# Patient Record
Sex: MALE | Race: WHITE | NOT HISPANIC OR LATINO
[De-identification: names, ages, dates, MRNs, and addresses within clinical notes are randomized per-mention and may not be internally consistent; named-entity substitution may affect disease eponyms.]

---

## 2021-08-24 PROBLEM — Z00.00 ENCOUNTER FOR PREVENTIVE HEALTH EXAMINATION: Status: ACTIVE | Noted: 2021-08-24

## 2021-08-25 ENCOUNTER — APPOINTMENT (OUTPATIENT)
Dept: PEDIATRIC ORTHOPEDIC SURGERY | Facility: CLINIC | Age: 71
End: 2021-08-25
Payer: OTHER MISCELLANEOUS

## 2021-08-25 VITALS — WEIGHT: 160 LBS | BODY MASS INDEX: 25.71 KG/M2 | TEMPERATURE: 98 F | HEIGHT: 66 IN

## 2021-08-25 DIAGNOSIS — Z86.39 PERSONAL HISTORY OF OTHER ENDOCRINE, NUTRITIONAL AND METABOLIC DISEASE: ICD-10-CM

## 2021-08-25 PROCEDURE — 99212 OFFICE O/P EST SF 10 MIN: CPT

## 2021-08-25 PROCEDURE — 99072 ADDL SUPL MATRL&STAF TM PHE: CPT

## 2021-08-25 RX ORDER — SIMVASTATIN 20 MG/1
20 TABLET, FILM COATED ORAL
Refills: 0 | Status: ACTIVE | COMMUNITY

## 2021-08-25 RX ORDER — PREDNISONE 10 MG/1
10 TABLET ORAL
Refills: 0 | Status: ACTIVE | COMMUNITY

## 2021-08-25 RX ORDER — DIVALPROEX SODIUM 500 MG/1
500 TABLET, DELAYED RELEASE ORAL
Refills: 0 | Status: ACTIVE | COMMUNITY

## 2021-08-25 RX ORDER — IBUPROFEN 800 MG/1
800 TABLET, FILM COATED ORAL 3 TIMES DAILY
Qty: 90 | Refills: 2 | Status: ACTIVE | COMMUNITY
Start: 2021-08-25 | End: 1900-01-01

## 2021-08-25 RX ORDER — PANTOPRAZOLE SODIUM 40 MG/1
40 GRANULE, DELAYED RELEASE ORAL
Refills: 0 | Status: ACTIVE | COMMUNITY

## 2021-08-26 NOTE — ASSESSMENT
[FreeTextEntry1] : Left lumbar radiculitis\par \par This patient will be treated with Motrin 800 mg 3 times a day.  He will return in 3 weeks for reevaluation.  If he is not improving his spine will be injected with Depo-Medrol and lidocaine.

## 2021-08-26 NOTE — PHYSICAL EXAM
[de-identified] : On physical examination there is bilateral lumbar muscle spasm worse on the left than the right.  The patient has only 30 degrees of flexion of the lumbar spine and left lateral bending increases his left leg pain.  Straight leg raising is positive on the left at 50 degrees.  Motor sensory and deep tendon reflex examination of both lower extremities is within normal limits.

## 2021-08-26 NOTE — HISTORY OF PRESENT ILLNESS
[de-identified] : This patient returns for reevaluation of lumbar radiculitis secondary to a work-related injury.  The patient has had an acute exacerbation of the pain at this time.

## 2021-09-22 ENCOUNTER — APPOINTMENT (OUTPATIENT)
Dept: PEDIATRIC ORTHOPEDIC SURGERY | Facility: CLINIC | Age: 71
End: 2021-09-22
Payer: OTHER MISCELLANEOUS

## 2021-09-22 VITALS — BODY MASS INDEX: 25.71 KG/M2 | HEIGHT: 66 IN | WEIGHT: 160 LBS

## 2021-09-22 PROCEDURE — 99212 OFFICE O/P EST SF 10 MIN: CPT

## 2021-09-22 PROCEDURE — 99072 ADDL SUPL MATRL&STAF TM PHE: CPT

## 2021-09-22 NOTE — PHYSICAL EXAM
[de-identified] : On physical examination there is left lumbar muscle spasm.  Left lateral bending and rotation increases this patient's pain.  There is positive straight leg raising on the left at 60 degrees.  Motor sensory and deep tendon reflex examination of both lower extremities is within normal limits.

## 2021-09-22 NOTE — HISTORY OF PRESENT ILLNESS
[de-identified] : This 71-year-old male returns for reevaluation of a left lumbar radiculitis secondary to a work-related injury.  This is been a chronic problem for this patient.  He has been treated symptomatically and he has settled down at this time.

## 2021-09-22 NOTE — ASSESSMENT
[FreeTextEntry1] : Left lumbar radiculitis\par \par Patient will continue symptomatic treatment and he will return on a as needed basis.\par \par Encounter time: 17 minutes

## 2021-11-23 ENCOUNTER — APPOINTMENT (OUTPATIENT)
Dept: PEDIATRIC ORTHOPEDIC SURGERY | Facility: CLINIC | Age: 71
End: 2021-11-23
Payer: OTHER MISCELLANEOUS

## 2021-11-23 VITALS — WEIGHT: 160 LBS | BODY MASS INDEX: 25.71 KG/M2 | HEIGHT: 66 IN

## 2021-11-23 DIAGNOSIS — L02.31 CUTANEOUS ABSCESS OF BUTTOCK: ICD-10-CM

## 2021-11-23 PROCEDURE — 20552 NJX 1/MLT TRIGGER POINT 1/2: CPT

## 2021-11-23 PROCEDURE — 99213 OFFICE O/P EST LOW 20 MIN: CPT | Mod: 25

## 2021-11-23 PROCEDURE — 99072 ADDL SUPL MATRL&STAF TM PHE: CPT

## 2021-11-23 RX ADMIN — TRIAMCINOLONE ACETONIDE 0 MG/ML: 40 INJECTION, SUSPENSION INTRA-ARTICULAR; INTRAMUSCULAR at 00:00

## 2021-11-23 RX ADMIN — Medication 0 %: at 00:00

## 2021-11-23 NOTE — HISTORY OF PRESENT ILLNESS
[de-identified] : This 71-year-old male with chronic lumbar radiculitis returns for marked increase in bilateral lumbar spine pain.  He has mostly left leg pain but occasionally right leg pain.  There has been no new injury.

## 2021-11-23 NOTE — ASSESSMENT
[FreeTextEntry1] : Chronic lumbar radiculitis\par Myofascial pain syndrome lumbar spine\par \par The patient had trigger point injections into the right and left lumbar musculature.  He will return on a as needed basis.\par \par Encounter time: 20 minutes

## 2021-11-23 NOTE — PHYSICAL EXAM
[de-identified] : On physical examination the patient has bilateral lumbar muscle spasm worse on the left than the right.  Range of motion is significantly decreased with left and right lateral bending increases his back pain straight leg raising is positive bilaterally.  Motor or sensory and deep tendon reflex examination of both lower extremities is within normal limits.

## 2021-11-24 ENCOUNTER — MED ADMIN CHARGE (OUTPATIENT)
Age: 71
End: 2021-11-24

## 2021-11-24 RX ORDER — LIDOCAINE HYDROCHLORIDE 10 MG/ML
1 INJECTION, SOLUTION INFILTRATION; PERINEURAL
Qty: 0 | Refills: 0 | Status: COMPLETED | OUTPATIENT
Start: 2021-11-23

## 2021-11-24 RX ORDER — TRIAMCINOLONE ACETONIDE 40 MG/ML
40 SUSPENSION INTRA-ARTERIAL; INTRAMUSCULAR
Qty: 1 | Refills: 0 | Status: COMPLETED | OUTPATIENT
Start: 2021-11-23

## 2022-09-15 ENCOUNTER — APPOINTMENT (OUTPATIENT)
Dept: PEDIATRIC ORTHOPEDIC SURGERY | Facility: CLINIC | Age: 72
End: 2022-09-15

## 2022-09-15 VITALS — WEIGHT: 160 LBS | HEIGHT: 66 IN | BODY MASS INDEX: 25.71 KG/M2

## 2022-09-15 PROCEDURE — 99212 OFFICE O/P EST SF 10 MIN: CPT | Mod: 25

## 2022-09-15 PROCEDURE — 20552 NJX 1/MLT TRIGGER POINT 1/2: CPT

## 2022-09-15 PROCEDURE — 99072 ADDL SUPL MATRL&STAF TM PHE: CPT

## 2022-09-15 RX ORDER — MELOXICAM 15 MG/1
15 TABLET ORAL DAILY
Qty: 30 | Refills: 5 | Status: ACTIVE | COMMUNITY
Start: 2022-09-15 | End: 1900-01-01

## 2022-09-15 NOTE — PHYSICAL EXAM
[de-identified] : On physical examination there is left lumbar muscle spasm and tenderness.  Left lateral bending and rotation increases his back and leg pain.  There is positive straight leg raising on the left at 50 degrees.  Motor sensory and deep tendon reflex examination of both lower extremities is within normal limits.

## 2022-09-15 NOTE — PROCEDURE
[de-identified] : 2 mils of 1% plain lidocaine and 1 mL of 40 mg of Depo-Medrol have been injected into the left lumbar musculature (trigger point injection).

## 2022-09-15 NOTE — HISTORY OF PRESENT ILLNESS
[de-identified] : This 72-year-old male returns with increasing left-sided low back pain with radiation into the left leg.  Patient is having difficulty with bending and sleeping.

## 2022-09-15 NOTE — ASSESSMENT
[FreeTextEntry1] : Left lumbar radiculitis secondary to work-related injury\par \par Patient has also been given a prescription for meloxicam.  He will return on a as needed basis.

## 2022-09-26 ENCOUNTER — APPOINTMENT (OUTPATIENT)
Dept: PEDIATRIC ORTHOPEDIC SURGERY | Facility: CLINIC | Age: 72
End: 2022-09-26

## 2022-09-26 VITALS — BODY MASS INDEX: 25.71 KG/M2 | HEIGHT: 66 IN | WEIGHT: 160 LBS

## 2022-09-26 PROCEDURE — 99072 ADDL SUPL MATRL&STAF TM PHE: CPT

## 2022-09-26 PROCEDURE — 99212 OFFICE O/P EST SF 10 MIN: CPT | Mod: 25

## 2022-09-26 PROCEDURE — 20552 NJX 1/MLT TRIGGER POINT 1/2: CPT

## 2022-09-26 RX ORDER — SULINDAC 200 MG/1
200 TABLET ORAL TWICE DAILY
Qty: 60 | Refills: 1 | Status: ACTIVE | COMMUNITY
Start: 2022-09-26 | End: 1900-01-01

## 2022-09-26 NOTE — ASSESSMENT
[FreeTextEntry1] : Pression: Myalgias lumbar spine.\par \par I have injected the trigger point he has been placed on Clinoril and will return as necessary

## 2022-09-26 NOTE — HISTORY OF PRESENT ILLNESS
[de-identified] : This 72-year-old returns with persistent pain in the left side of the lumbar region where he was previously injected.  He did not get any benefit at all from the injection or the Mobic which had been prescribed.  He continues to have significant pain that radiates down his left leg

## 2022-09-26 NOTE — PROCEDURE
[FreeTextEntry1] : Under sterile technique with an alcohol prep to trigger point in the left-sided lumbar musculature was injected with 40 mg of methylprednisolone and 4 cc of 1% lidocaine with a Band-Aid dressing applied at the conclusion this was tolerated without incident

## 2022-09-26 NOTE — PHYSICAL EXAM
[de-identified] : His exam reveals normal gait he has restricted motion to the lumbar spine in flexion extension mainly pain and spasm on the left side with a trigger point over the lumbar musculature.  He is neurologically stable

## 2022-10-03 ENCOUNTER — APPOINTMENT (OUTPATIENT)
Dept: PEDIATRIC ORTHOPEDIC SURGERY | Facility: CLINIC | Age: 72
End: 2022-10-03

## 2022-10-03 VITALS — TEMPERATURE: 98.2 F | BODY MASS INDEX: 25.71 KG/M2 | HEIGHT: 66 IN | WEIGHT: 160 LBS

## 2022-10-03 PROCEDURE — 20552 NJX 1/MLT TRIGGER POINT 1/2: CPT

## 2022-10-03 PROCEDURE — 99072 ADDL SUPL MATRL&STAF TM PHE: CPT

## 2022-10-03 PROCEDURE — 99212 OFFICE O/P EST SF 10 MIN: CPT | Mod: 25

## 2022-10-03 RX ORDER — METHYLPREDNISOLONE 4 MG/1
4 TABLET ORAL
Qty: 1 | Refills: 0 | Status: ACTIVE | COMMUNITY
Start: 2022-10-03 | End: 1900-01-01

## 2022-10-03 NOTE — ASSESSMENT
[FreeTextEntry1] : Chronic left lumbar radiculitis\par \par The patient has been placed on a Medrol Dosepak.  He will return in 2 weeks for reevaluation.  If he has not improved he will be indicated for an MRI of the lumbar spine.  Plain film of the lumbar spine will be taken at that time as well.

## 2022-10-03 NOTE — HISTORY OF PRESENT ILLNESS
[de-identified] : This 72-year-old male returns for reevaluation of chronic left lumbar radiculitis secondary to a work-related injury.  The patient has had marked increase in the left leg pain making it difficult for him to perform his usual activities of daily living.

## 2022-10-03 NOTE — PHYSICAL EXAM
[de-identified] : On physical examination patient has marked left lumbar muscle spasm and tenderness.  There is positive straight leg raising on the left at 40 degrees.  Motor sensory and deep tendon reflex examination of both lower extremities is within normal limits.

## 2022-10-03 NOTE — PROCEDURE
[de-identified] : 2 mL of 1% lidocaine and 1 mL of 40 mg of Depo-Medrol have been injected into the left lumbar musculature (trigger point injection)

## 2022-10-17 ENCOUNTER — APPOINTMENT (OUTPATIENT)
Dept: PEDIATRIC ORTHOPEDIC SURGERY | Facility: CLINIC | Age: 72
End: 2022-10-17

## 2022-10-17 VITALS — WEIGHT: 160 LBS | HEIGHT: 66 IN | BODY MASS INDEX: 25.71 KG/M2

## 2022-10-17 DIAGNOSIS — M79.18 MYALGIA, OTHER SITE: ICD-10-CM

## 2022-10-17 DIAGNOSIS — M54.16 RADICULOPATHY, LUMBAR REGION: ICD-10-CM

## 2022-10-17 PROCEDURE — 99213 OFFICE O/P EST LOW 20 MIN: CPT

## 2022-10-17 PROCEDURE — 99072 ADDL SUPL MATRL&STAF TM PHE: CPT

## 2022-10-17 NOTE — PHYSICAL EXAM
[de-identified] : On physical examination the patient has bilateral lumbar muscle spasm worse on the left than the right.  left lateral bending and rotation increases this patient's back and leg pain.  Straight leg raising is positive on the left at 50 degrees.  Motor sensory and deep tendon reflex examination of both lower extremities is within normal limits.

## 2022-10-17 NOTE — ASSESSMENT
[FreeTextEntry1] : Chronic left lumbar radiculitis secondary to work-related injury\par \par This patient will continue symptomatic treatment.  He will return in 1 month for reevaluation.

## 2022-11-14 ENCOUNTER — APPOINTMENT (OUTPATIENT)
Dept: PEDIATRIC ORTHOPEDIC SURGERY | Facility: CLINIC | Age: 72
End: 2022-11-14

## 2022-11-14 VITALS — HEIGHT: 66 IN | BODY MASS INDEX: 25.71 KG/M2 | TEMPERATURE: 98.3 F | WEIGHT: 160 LBS

## 2022-11-14 PROCEDURE — 20610 DRAIN/INJ JOINT/BURSA W/O US: CPT | Mod: LT

## 2022-11-14 PROCEDURE — 73030 X-RAY EXAM OF SHOULDER: CPT

## 2022-11-14 PROCEDURE — 99072 ADDL SUPL MATRL&STAF TM PHE: CPT

## 2022-11-14 PROCEDURE — 99212 OFFICE O/P EST SF 10 MIN: CPT | Mod: 25

## 2022-11-14 PROCEDURE — 73060 X-RAY EXAM OF HUMERUS: CPT

## 2022-11-15 NOTE — HISTORY OF PRESENT ILLNESS
[FreeTextEntry1] : This 72-year-old male is here for evaluation of a 2-week history of left shoulder and upper arm pain without history of injury.  Patient is having difficulty moving his arm above his head.  He is also complaining of significant pain in left arm (anterior aspect).

## 2022-11-15 NOTE — DATA REVIEWED
[de-identified] : X-ray evaluation of the left shoulder on 11/14/2022 (AP and lateral views) reveals calcific tendinitis of the shoulder.  There is also degenerative changes noted in the acromioclavicular joint and to a lesser degree the glenohumeral joint\par Indication for shoulder x-ray: To determine presence of arthritis or bone lesion.\par \par X-ray evaluation of the left humerus on 11/14/2022 (AP and lateral views) reveals no obvious abnormalities.\par Indication for humeral x-ray: To determine presence of bone lesion

## 2022-11-15 NOTE — ASSESSMENT
[FreeTextEntry1] : Calcific tendinitis left shoulder\par Biceps tendinitis\par \par This patient will return if symptoms persist past 2 weeks.

## 2022-11-15 NOTE — PHYSICAL EXAM
[FreeTextEntry1] : Patient has limited range of motion of the left shoulder.  He can actively abduct to 80 degrees and forward flex to 90 degrees.  Attempts at external rotation with the shoulder held in 90 degrees of abduction causes significant pain.  The patient has tenderness along the course of the humerus.  Attempts at flexion of the elbow against resistance increases his pain.  The biceps tendon proximally is intact.

## 2022-11-15 NOTE — PROCEDURE
[de-identified] : 1 mL of 40 mg of Depo-Medrol and 2 mL of 1% plain lidocaine have been injected into the left shoulder.

## 2022-11-28 ENCOUNTER — APPOINTMENT (OUTPATIENT)
Dept: PEDIATRIC ORTHOPEDIC SURGERY | Facility: CLINIC | Age: 72
End: 2022-11-28

## 2022-11-28 VITALS — TEMPERATURE: 97.2 F | BODY MASS INDEX: 25.71 KG/M2 | WEIGHT: 160 LBS | HEIGHT: 66 IN

## 2022-11-28 DIAGNOSIS — M75.32 CALCIFIC TENDINITIS OF LEFT SHOULDER: ICD-10-CM

## 2022-11-28 DIAGNOSIS — M75.22 BICIPITAL TENDINITIS, LEFT SHOULDER: ICD-10-CM

## 2022-11-28 PROCEDURE — 20551 NJX 1 TENDON ORIGIN/INSJ: CPT | Mod: LT

## 2022-11-28 PROCEDURE — 99212 OFFICE O/P EST SF 10 MIN: CPT | Mod: 25

## 2022-11-30 PROBLEM — M75.22 BICEPS TENDINITIS OF LEFT SHOULDER: Status: ACTIVE | Noted: 2022-11-15

## 2022-11-30 NOTE — PHYSICAL EXAM
[FreeTextEntry1] : On physical examination there is no tenderness where the humerus was previously injected but there is tenderness at the insertion of the deltoid on the lateral aspect of the humerus.  Attempts at abduction of the shoulder against resistance increases his pain.

## 2022-11-30 NOTE — HISTORY OF PRESENT ILLNESS
[FreeTextEntry1] : This patient returns for reevaluation of calcific tendinitis of the left shoulder.  Patient continues to have pain along the lateral aspect of the shoulder at the insertion of the deltoid on the humerus.  This is quite tender.

## 2022-11-30 NOTE — PROCEDURE
[de-identified] : 1 mL of 40 mg of Depo-Medrol and 2 mL of 1% plain lidocaine was injected into the left shoulder.

## 2022-11-30 NOTE — ASSESSMENT
[FreeTextEntry1] : Calcific tendinitis left shoulder\par \par Because this patient has gastric reflux I have not placed him on an anti-inflammatory medication.  He may return for repeat injection.  He may have to have an MRI.

## 2024-01-26 ENCOUNTER — LABORATORY RESULT (OUTPATIENT)
Age: 74
End: 2024-01-26

## 2024-01-26 ENCOUNTER — APPOINTMENT (OUTPATIENT)
Dept: PEDIATRIC ORTHOPEDIC SURGERY | Facility: CLINIC | Age: 74
End: 2024-01-26
Payer: MEDICARE

## 2024-01-26 VITALS
WEIGHT: 175 LBS | TEMPERATURE: 96.8 F | BODY MASS INDEX: 26.52 KG/M2 | HEIGHT: 68 IN | DIASTOLIC BLOOD PRESSURE: 81 MMHG | SYSTOLIC BLOOD PRESSURE: 150 MMHG

## 2024-01-26 DIAGNOSIS — M23.8X2 OTHER INTERNAL DERANGEMENTS OF LEFT KNEE: ICD-10-CM

## 2024-01-26 PROCEDURE — 20610 DRAIN/INJ JOINT/BURSA W/O US: CPT | Mod: LT

## 2024-01-26 PROCEDURE — 73562 X-RAY EXAM OF KNEE 3: CPT | Mod: LT

## 2024-01-26 PROCEDURE — 99213 OFFICE O/P EST LOW 20 MIN: CPT | Mod: 25

## 2024-01-26 RX ORDER — METHYLPREDNISOLONE 4 MG/1
4 TABLET ORAL
Qty: 1 | Refills: 1 | Status: ACTIVE | COMMUNITY
Start: 2024-01-26 | End: 1900-01-01

## 2024-01-26 NOTE — HISTORY OF PRESENT ILLNESS
[de-identified] : This 73-year-old is seen with a 1 week history of acute onset of pain and swelling to the left knee with stiffness and limp.  He does not have an obvious traumatic or precipitating event no locking popping or sensation of instability.  No recent illness fever or rash no other joint complaints.  Prior to this she had been doing well

## 2024-01-26 NOTE — PROCEDURE
[FreeTextEntry1] : Under sterile technique with a Betadine prep left knee was aspirated from the suprapatellar medial approach with an 18-gauge needle of approximately 20 cc of xanthochromic fluid following which the knee was injected with 40 mg of methylprednisolone and 2 cc of 1% lidocaine with a Band-Aid dressing applied at the conclusion.  This was tolerated without incident

## 2024-01-26 NOTE — ASSESSMENT
[FreeTextEntry1] : Impression: Internal derangement left knee with mild degenerative change rule out Lyme arthritis.  The knee was aspirated and injected with 40 mg of methylprednisolone and 2 cc of 1% lidocaine and the fluid was sent for a Lyme PCR.  He has been placed on a Medrol Dosepak will return if he is not progressing

## 2024-01-26 NOTE — PHYSICAL EXAM
[de-identified] : Examination today reveals an antalgic gait on the left side he has good motion to the left hip the left knee has full extension flexes only approximately 100 degrees.  He has a large effusion with pain in both medial lateral compartments.  No obvious instability on stress of the cruciate/collateral ligaments.  Popliteal fossa calf neuro vas exam are negative.  X-rays ordered and taken today of the left knee standing with a Stephenson view reveal mild degenerative joint disease no lesion/loose body

## 2024-03-13 ENCOUNTER — APPOINTMENT (OUTPATIENT)
Dept: PEDIATRIC ORTHOPEDIC SURGERY | Facility: CLINIC | Age: 74
End: 2024-03-13
Payer: MEDICARE

## 2024-03-13 VITALS
SYSTOLIC BLOOD PRESSURE: 145 MMHG | TEMPERATURE: 97 F | WEIGHT: 175 LBS | BODY MASS INDEX: 26.52 KG/M2 | HEIGHT: 68 IN | DIASTOLIC BLOOD PRESSURE: 80 MMHG

## 2024-03-13 PROCEDURE — 99213 OFFICE O/P EST LOW 20 MIN: CPT | Mod: 25

## 2024-03-13 PROCEDURE — 20610 DRAIN/INJ JOINT/BURSA W/O US: CPT | Mod: RT

## 2024-03-13 NOTE — DISCUSSION/SUMMARY
[de-identified] : Impression: Strain right rotator cuff.  The subacromial space has been injected without incident.  Will return on a as needed basis

## 2024-03-13 NOTE — HISTORY OF PRESENT ILLNESS
[de-identified] : This 74-year-old seen today for evaluation of his right shoulder.  He has had recent onset of pain with stiffness and difficulty with placement of his arm in space for functional activities.  No obvious injury no numbness paresthesias no clicking or popping

## 2024-03-13 NOTE — PHYSICAL EXAM
[de-identified] : His exam today reveals he has reasonable motion to the shoulder though there is with discomfort at the limits he has pain in the subacromial space and over the greater tuberosity no instability on stress positive Neer and Colvin strength is acceptable for age.

## 2024-03-13 NOTE — PROCEDURE
ASC Screening    ASC Screening  BMI > than 45: No  Are you currently pregnant?: No  Do you rely on a wheelchair for mobility?: No  Do you need oxygen during the day?: No  Have you ever been informed by anesthesia that you have a difficult airway?: No  Have you been diagnosed with End Stage Renal Disease (ESRD)?: No  Are you actively on dialysis?: No  Have you been diagnosed with Pulmonary Hypertension?: No  Do you have a pacemaker or an Automatic Implantable Cardioverter Defibrillator (AICD)?: No  Have you ever had an organ transplant?: No  Have you had a stroke, heart attack, myocardial infarction (MI) within the last 6 months?: No  Have you ever been diagnosed with Aortic Stenosis?: No  Have you ever been diagnosed  with Congestive Heart Failure?: No  Have you ever been diagnosed with a heart valve disease?: No  Are you Diabetic?: No  If you are Diabetic, has your A1C been greater than 12 within the last six months?: No
[FreeTextEntry1] : Under sterile technique with a Betadine prep the subacromial space of the left shoulder was injected with 40 mg of methylprednisolone and 5 cc of 1% lidocaine with a Band-Aid dressing applied at the conclusion this was tolerated without incident

## 2024-05-22 ENCOUNTER — APPOINTMENT (OUTPATIENT)
Dept: PEDIATRIC ORTHOPEDIC SURGERY | Facility: CLINIC | Age: 74
End: 2024-05-22
Payer: MEDICARE

## 2024-05-22 DIAGNOSIS — S46.011A STRAIN OF MUSCLE(S) AND TENDON(S) OF THE ROTATOR CUFF OF RIGHT SHOULDER, INITIAL ENCOUNTER: ICD-10-CM

## 2024-05-22 PROCEDURE — 99212 OFFICE O/P EST SF 10 MIN: CPT | Mod: 25

## 2024-05-22 PROCEDURE — 20610 DRAIN/INJ JOINT/BURSA W/O US: CPT | Mod: RT

## 2024-05-22 NOTE — PROCEDURE
[FreeTextEntry1] : 1 mL of 40 mg of Depo-Medrol and 2 mL of 1% plain lidocaine have been injected into the right shoulder without adverse reaction.

## 2024-05-22 NOTE — PHYSICAL EXAM
[de-identified] : On physical examination there is crepitus on range of motion of the right shoulder.  There is marked tenderness both anteriorly and posteriorly.  There is no obvious instability.

## 2024-05-22 NOTE — HISTORY OF PRESENT ILLNESS
[de-identified] : This 74-year-old male returns with continued complaints of right shoulder pain despite having fairly recent cortisone injection.  He has noted crepitus on range of motion of the shoulder.

## 2024-05-22 NOTE — ASSESSMENT
[FreeTextEntry1] : Rule out rotator cuff tear right shoulder  I advised the patient that if he persists in having pain then an MRI should be undertaken.  He will return on a as needed basis.

## 2025-03-26 ENCOUNTER — NON-APPOINTMENT (OUTPATIENT)
Age: 75
End: 2025-03-26

## 2025-03-26 ENCOUNTER — APPOINTMENT (OUTPATIENT)
Dept: PEDIATRIC ORTHOPEDIC SURGERY | Facility: CLINIC | Age: 75
End: 2025-03-26
Payer: OTHER MISCELLANEOUS

## 2025-03-26 ENCOUNTER — APPOINTMENT (OUTPATIENT)
Dept: PEDIATRIC ORTHOPEDIC SURGERY | Facility: CLINIC | Age: 75
End: 2025-03-26

## 2025-03-26 VITALS
TEMPERATURE: 96.8 F | WEIGHT: 172 LBS | SYSTOLIC BLOOD PRESSURE: 145 MMHG | DIASTOLIC BLOOD PRESSURE: 85 MMHG | HEIGHT: 68 IN | BODY MASS INDEX: 26.07 KG/M2

## 2025-03-26 DIAGNOSIS — M54.16 RADICULOPATHY, LUMBAR REGION: ICD-10-CM

## 2025-03-26 DIAGNOSIS — M79.18 MYALGIA, OTHER SITE: ICD-10-CM

## 2025-03-26 PROCEDURE — 99212 OFFICE O/P EST SF 10 MIN: CPT | Mod: 25

## 2025-03-26 PROCEDURE — 20552 NJX 1/MLT TRIGGER POINT 1/2: CPT | Mod: LT
